# Patient Record
Sex: MALE | URBAN - METROPOLITAN AREA
[De-identification: names, ages, dates, MRNs, and addresses within clinical notes are randomized per-mention and may not be internally consistent; named-entity substitution may affect disease eponyms.]

---

## 2018-06-09 ENCOUNTER — DOCUMENTATION ONLY (OUTPATIENT)
Dept: SPORTS MEDICINE | Facility: CLINIC | Age: 23
End: 2018-06-09

## 2018-06-13 NOTE — PROGRESS NOTES
[email or web address] Pre-particpation physical    22 y.o. year-old [place]       RECENT HISTORY:   1. No current or recent symptoms    Orthopaedic history:  None reported    PHYSICAL EXAM:      GEN: Alert and oriented x3. In no apparent distress.     Well-developed, well-nourished male. Observation of ears, eyes, and nose   reveal no gross abnormalities.  See ophthal report for full eye exam    CERVICAL SPINE: Full range of motion with no deficits.     BILATERAL SHOULDER EXAM: shows full symmetric range of motion with 5/5   strength throughout the supraspinatus and infraspinatus, deltoid, and   subscapularis. No evidence of instability.     BILATERAL ELBOW EXAM: Shows full and symmetric extension/flexion, and   pronation/supination, and varus/valgus stability. Negative posterolateral   rotatory instability.     BILATERAL WRIST EXAM: Shows normal and symmetric full flexion/extension   and radial/ulnar deviation.     HAND EXAM: Shows full range of motion to bilateral hands and full   strength and stability to all fingers.     LUMBAR SPINE EXAM: Shows no evidence of any scoliosis. He has full   flexion and extension with no pain and no apparent tenderness to the   spine. Negative straight leg raise bilaterally.     HIP EXAM: Shows full range of motion without pain or signs of impingement.   Symmetric internal rotation without pain.  Has 5/5 hip flexion strength and 5/5 strength testing to the entire lower extremity.     KNEE EXAM: Examination of bilateral knees shows symmetric range of motion   0 to 145 degrees with negative Lachman and stable to varus-valgus stress   testing. No joint line tenderness. Good quad tone. No effusion of either knee.     Bilateral legs: NTTP over tibiae mid shaft or distal.      FOOT AND ANKLE EXAM: Shows good range of motion to bilateral ankles with   no neurologic deficit. There is a 5/5 strength exam throughout the foot   and ankle exam. There is a normal arch on both feet. There is no  tenderness to palpation along either foot. - turf toe exam      IMAGING:   None today      ASSESSMENT:   Orthopedically cleared for participation for workouts